# Patient Record
Sex: MALE | URBAN - METROPOLITAN AREA
[De-identification: names, ages, dates, MRNs, and addresses within clinical notes are randomized per-mention and may not be internally consistent; named-entity substitution may affect disease eponyms.]

---

## 2020-11-17 ENCOUNTER — APPOINTMENT (OUTPATIENT)
Dept: URBAN - METROPOLITAN AREA CLINIC 270 | Age: 60
Setting detail: DERMATOLOGY
End: 2020-11-17

## 2020-11-17 NOTE — HPI: SKIN LESION
What Type Of Note Output Would You Prefer (Optional)?: Bullet Format
How Severe Is Your Skin Lesion?: mild
Has Your Skin Lesion Been Treated?: not been treated
Is This A New Presentation, Or A Follow-Up?: Skin Lesion
Additional History: He’s apprehensive about having lesion removed today unless provider feels necessary. Does not want to lose hair in that area and states lesion “is not bothering me”.

## 2020-12-08 ENCOUNTER — APPOINTMENT (OUTPATIENT)
Dept: URBAN - METROPOLITAN AREA CLINIC 270 | Age: 60
Setting detail: DERMATOLOGY
End: 2020-12-09

## 2020-12-08 PROBLEM — R22.1 LOCALIZED SWELLING, MASS AND LUMP, NECK: Status: ACTIVE | Noted: 2020-12-08

## 2020-12-08 PROCEDURE — OTHER REASSURANCE: OTHER

## 2020-12-08 PROCEDURE — OTHER ADDITIONAL NOTES: OTHER

## 2020-12-08 PROCEDURE — 99202 OFFICE O/P NEW SF 15 MIN: CPT

## 2020-12-08 NOTE — PROCEDURE: ADDITIONAL NOTES
Additional Notes: Pt reports that he has had his chiropractor work on his neck and now feels a lump. Tenderness is pinpoint, muscle will spam with pressure, muscle is swollen. This is what the pt is referring to as a lump. No redness noted. No lipoma or cyst noted. Needs to follow up with pcp. Pt reports no pcp.  Will refer to [facility] for evaluation, possible trigger point injections
Detail Level: Simple